# Patient Record
Sex: MALE | Race: WHITE | NOT HISPANIC OR LATINO | URBAN - NONMETROPOLITAN AREA
[De-identification: names, ages, dates, MRNs, and addresses within clinical notes are randomized per-mention and may not be internally consistent; named-entity substitution may affect disease eponyms.]

---

## 2024-10-04 ENCOUNTER — OFFICE VISIT (OUTPATIENT)
Dept: URGENT CARE | Facility: URGENT CARE | Age: 34
End: 2024-10-04
Payer: MEDICAID

## 2024-10-04 VITALS
HEART RATE: 126 BPM | OXYGEN SATURATION: 96 % | DIASTOLIC BLOOD PRESSURE: 98 MMHG | TEMPERATURE: 98.4 F | RESPIRATION RATE: 20 BRPM | WEIGHT: 298.06 LBS | BODY MASS INDEX: 44.05 KG/M2 | SYSTOLIC BLOOD PRESSURE: 144 MMHG

## 2024-10-04 DIAGNOSIS — K21.9 GASTROESOPHAGEAL REFLUX DISEASE, UNSPECIFIED WHETHER ESOPHAGITIS PRESENT: ICD-10-CM

## 2024-10-04 DIAGNOSIS — A08.4 VIRAL GASTROENTERITIS: Primary | ICD-10-CM

## 2024-10-04 DIAGNOSIS — E86.0 DEHYDRATION: ICD-10-CM

## 2024-10-04 RX ORDER — OLANZAPINE 10 MG/1
TABLET ORAL
COMMUNITY
Start: 2024-09-30

## 2024-10-04 RX ORDER — CITALOPRAM 10 MG/1
TABLET ORAL
COMMUNITY
Start: 2024-09-30

## 2024-10-04 RX ORDER — FAMOTIDINE 20 MG/1
20 TABLET, FILM COATED ORAL 2 TIMES DAILY
Qty: 60 TABLET | Refills: 0 | Status: SHIPPED | OUTPATIENT
Start: 2024-10-04 | End: 2024-11-03

## 2024-10-04 RX ORDER — PROMETHAZINE HYDROCHLORIDE 12.5 MG/1
12.5 TABLET ORAL EVERY 8 HOURS PRN
Qty: 9 TABLET | Refills: 0 | Status: SHIPPED | OUTPATIENT
Start: 2024-10-04 | End: 2024-10-07

## 2024-10-04 RX ORDER — ATOMOXETINE 40 MG/1
CAPSULE ORAL
COMMUNITY
Start: 2024-09-30

## 2024-10-04 NOTE — LETTER
October 4, 2024     Patient: Tony Sandoval   YOB: 1990   Date of Visit: 10/4/2024       To Whom It May Concern:    Tony Sandoval was seen in my clinic on 10/4/2024 at 1:20 pm. Please excuse Tony for his absence from work on 10/4/24, may return to work 10/5/24 if fever free and symptoms improving.    If you have any questions or concerns, please don't hesitate to call.         Sincerely,         Aicha Last PA-C        CC: No Recipients

## 2024-10-04 NOTE — PATIENT INSTRUCTIONS
Viral Gastroenteritis    You have been prescribed Phenergan, a medication that helps relieve nausea and vomiting; take every 8 hours as needed.  Start Pepcid twice a day x 2 weeks, take with meals. Hydration with PEDIALYTE and water. avoid spicy or citrus foods or caffeine which may worsen abdominal pain and heartburn. Avoid fried fatty foods. [place] diet; Banana, rice, toast, applesauce, jello, soup, etc    If your symptoms are not improving or worsening, please go to the ER for rehydration and evaluation.       Follow up with your primary provider within the next 7 days

## 2024-10-04 NOTE — PROGRESS NOTES
Subjective   Patient ID: Tony Sandoval is a 34 y.o. male. They present today with a chief complaint of Illness (Diarrhea, nausea x2 days).    History of Present Illness    Illness  Pt presents with nonbloody diarrhea 6 times yesterday and nonbloody vomiting 3 times yesterday. He denies any diarrhea or vomiting today. He reports nausea and heartburn today. No appetite. He drinks 2-3 pepsi a day and some water. He voided once today. He denies any chest pain or dizziness. Pt declined all testing.    Past Medical History  Allergies as of 10/04/2024 - Reviewed 10/04/2024   Allergen Reaction Noted    Cat dander Other 05/10/2016    Mold Other 05/10/2016       (Not in a hospital admission)       History reviewed. No pertinent past medical history.    History reviewed. No pertinent surgical history.     reports that he has never smoked. He has never used smokeless tobacco.    Review of Systems  Review of Systems                               Objective    Vitals:    10/04/24 1423   BP: (!) 144/98   BP Location: Left arm   Patient Position: Sitting   BP Cuff Size: Large adult   Pulse: (!) 126   Resp: 20   Temp: 36.9 °C (98.4 °F)   TempSrc: Oral   SpO2: 96%   Weight: 135 kg (298 lb 1 oz)     No LMP for male patient.    Physical Exam  Constitutional:       Appearance: Normal appearance.      Comments: Pleasant white male, obese body habitus   HENT:      Head: Normocephalic and atraumatic.      Right Ear: Tympanic membrane, ear canal and external ear normal.      Left Ear: Tympanic membrane, ear canal and external ear normal.      Nose: Congestion and rhinorrhea present.      Mouth/Throat:      Pharynx: Posterior oropharyngeal erythema present.      Comments: Tachy oral mucosa  Eyes:      Extraocular Movements: Extraocular movements intact.      Conjunctiva/sclera: Conjunctivae normal.      Pupils: Pupils are equal, round, and reactive to light.   Cardiovascular:      Rate and Rhythm: Normal rate and regular rhythm.      Heart  sounds: No murmur heard.  Pulmonary:      Effort: Pulmonary effort is normal.      Breath sounds: Normal breath sounds. No wheezing.   Abdominal:      General: Abdomen is flat. Bowel sounds are normal.      Palpations: Abdomen is soft.      Tenderness: There is no guarding or rebound.      Comments: Mild tenderness epigastric area   Musculoskeletal:         General: Normal range of motion.      Cervical back: Normal range of motion and neck supple.   Lymphadenopathy:      Cervical: No cervical adenopathy.   Skin:     General: Skin is warm.      Comments: Diaphoretic on face. Cap refill 4 sec BUE   Neurological:      General: No focal deficit present.      Mental Status: He is alert and oriented to person, place, and time.   Psychiatric:         Mood and Affect: Mood normal.         Procedures    Point of Care Test & Imaging Results from this visit  No results found for this visit on 10/04/24.   No results found.    Diagnostic study results (if any) were reviewed by Aicha Last PA-C.    Assessment/Plan   Allergies, medications, history, and pertinent labs/EKGs/Imaging reviewed by Aicha Last PA-C.     Orders and Diagnoses  Diagnoses and all orders for this visit:  Viral gastroenteritis  -     promethazine (Phenergan) 12.5 mg tablet; Take 1 tablet (12.5 mg) by mouth every 8 hours if needed for nausea or vomiting for up to 3 days.  -     famotidine (Pepcid) 20 mg tablet; Take 1 tablet (20 mg) by mouth 2 times a day.  Gastroesophageal reflux disease, unspecified whether esophagitis present  -     famotidine (Pepcid) 20 mg tablet; Take 1 tablet (20 mg) by mouth 2 times a day.  Dehydration  35 yo M presents with nonbloody vomiting and diarrhea yesterday and day prior and none today. He presents for work excuse. Discussed abnormal vitals with tachycardia -130s and elevated BP consistent with dehydration in setting of delayed cap refill and tachy oral mucosa. Pt declined ER for IV hydration and  prefers to try Pedialyte for oral hydration and trial of Phenergan as needed for nausea and Pepcid twice a day x 10-14 days. Advised ER if unable to maintain oral hydration, dizziness or increased somnolence.      Patient disposition: Home    Electronically signed by Aicha Last PA-C  10:38 PM